# Patient Record
(demographics unavailable — no encounter records)

---

## 2025-01-30 NOTE — HISTORY OF PRESENT ILLNESS
[TextBox_4] : Patient is a 63 yo female here today for annual visit. She denies any GYN complaints at this time. she denies any PMB She is followed by Dr. Jeronimo  She has chronic hypertrophic vulvitis which she uses mycolog cream for with+ relief.  Her bp in office is elevated she has a dx of HTN on losartan and metoporol followed by Sumaya DELANEY

## 2025-01-30 NOTE — PLAN
[FreeTextEntry1] : Patient to follow up in 1 year for annual GYN exam Mammogram and bilateral breast US due: Per FS follows with him yearly.  Colonoscopy due:  12/2032 dr. duarte  Bone density due:    3/2026   reordered nystatin cream to tx hypertrophic vulvitis.  vaginal estradiol sent for vaginal atrophy  Pap ordered Hemoccult ordered  All questions answered, patient agreeable with plan.

## 2025-05-06 NOTE — HEALTH RISK ASSESSMENT
[Former] : Former [0-4] : 0-4 [> 15 Years] : > 15 Years [HIV test declined] : HIV test declined [Hepatitis C test declined] : Hepatitis C test declined [None] : None [With Significant Other] : lives with significant other [Employed] : employed [] :  [# Of Children ___] : has [unfilled] children [Yes] : Yes [2 - 3 times a week (3 pts)] : 2 - 3  times a week (3 points) [1 or 2 (0 pts)] : 1 or 2 (0 points) [Never (0 pts)] : Never (0 points) [No] : In the past 12 months have you used drugs other than those required for medical reasons? No [No falls in past year] : Patient reported no falls in the past year [0] : 2) Feeling down, depressed, or hopeless: Not at all (0) [PHQ-2 Negative - No further assessment needed] : PHQ-2 Negative - No further assessment needed [Sexually Active] : sexually active [Feels Safe at Home] : Feels safe at home [Fully functional (bathing, dressing, toileting, transferring, walking, feeding)] : Fully functional (bathing, dressing, toileting, transferring, walking, feeding) [Fully functional (using the telephone, shopping, preparing meals, housekeeping, doing laundry, using] : Fully functional and needs no help or supervision to perform IADLs (using the telephone, shopping, preparing meals, housekeeping, doing laundry, using transportation, managing medications and managing finances) [With Patient/Caregiver] : , with patient/caregiver [Reviewed no changes] : Reviewed, no changes [I will adhere to the patient's wishes.] : I will adhere to the patient's wishes. [Audit-CScore] : 3 [de-identified] : walking daily on lunch break and has treadmill [de-identified] : well balanced, avoids carbs [[id number]] : 0 [EyeExamDate] : 01/2025 [Patient reported mammogram was normal] : Patient reported mammogram was normal [Patient reported PAP Smear was normal] : Patient reported PAP Smear was normal [Patient reported bone density results were normal] : Patient reported bone density results were normal [Patient reported colonoscopy was normal] : Patient reported colonoscopy was normal [Change in mental status noted] : No change in mental status noted [Language] : denies difficulty with language [High Risk Behavior] : no high risk behavior [MammogramDate] : 07/2024 [PapSmearDate] : 01/2025 [BoneDensityDate] : 2021 [ColonoscopyDate] : 12/2022 [FreeTextEntry2] :  at [facility]

## 2025-05-06 NOTE — HISTORY OF PRESENT ILLNESS
[FreeTextEntry1] : CPE. [de-identified] : Patient is a 61yo female with PMH HTN, IFG, asthma, anemia who presents to the office for CPE.    Last CPE:  05/06/2024 with myself.  GYN Exam:  01/2025, NP Misa Graham; Pap negative for intraepithelial lesion or malignancy.  Mammogram:  07/2024, BIRADS-2 ([facility]; followed by breast surgery Dr. Jeronimo).  DEXA: 2021, reportedly normal.  Due 2026 per GYN.  Colonoscopy: 12/2022, Dr. Brothers; normal, repeat in 10 years.  Ophthalmology:  01/2025, [facility].  Dermatology:  [facility], Dr. Ross.  BCC on top of head, s/p Moh's with Dr. Baldo Jones ([place]).  [place]ist:  [place]. Shingles:  due, recommended today; pt declines.  Flu:  pt declines.  Tdap:  05/2018.  PNA:  offered Capvaxive, pt declines.  COVID:  received.   BP has been high at home.  Has log with her today and has been 150's-170's/90's on average.  Pt asymptomatic.  Takes Metoprolol Succinate 100mg and Losartan 25mg both at night.

## 2025-05-06 NOTE — ASSESSMENT
[Vaccines Reviewed] : Immunizations reviewed today. Please see immunization details in the vaccine log within the immunization flowsheet.  [FreeTextEntry1] : Patient is a 63yo female with PMH HTN, IFG, asthma, anemia who presents to the office for CPE.  Health Maintenance - UTD with routine HCM. - RX for mammogram/breast US provided to be done in 07/2025. - Fasting labs drawn in office. - EKG performed in office NSR, no acute ST/T changes, no arrhythmias.  Pt denies cardiac complaints. - Eat plenty of fruits and vegetables, especially deeply colored fruits/vegetables (such as leafy greens, peaches) that are more nutrient-dense.  Continue to work hard on diet and exercise, limiting/avoiding saturated fat, fatty foods, greasy foods, red meats, white flour-based carbohydrates (cookies, cakes, white bread, white rice), and added sugars.  Chose whole grain foods and products made with whole grains over refined grains and white flour-based carbohydrates.  Avoid beverages and food with added sugar.  Limit salt intake to improve blood pressure.  Limit alcohol intake. - Try and incorporate a minimum of 150 minutes of exercise per week of moderate activity.  You should also try to incorporate ~20 minutes of weight training to your regimen at least 2-3 times per week.  HTN - BP elevated in office x2.  BP elevated on home checks as well.  Pt asymptomatic. - Increase Losartan to 50mg daily, will take in AM. - Continue Metoprolol Succinate 100mg in PM. - Monitor blood pressure at home, keep log, alert office if too high/too low.  If persistently elevated over the next 2 weeks on home checks, will alert office and we will increase Losartan dose. - DASH diet encouraged, regular exercise encouraged. - Alert office if you develop any new, worsening or concerning symptoms including headache, vision changes, dizziness, loss of consciousness, chest pain, shortness of breath, or lower extremity edema.  Call the office or go to the ED immediately if you develop new, worsening or concerning symptoms including high fever, severe headache/worst headache of your life, confusion, dizziness/lightheadedness, loss of consciousness, severe chest pain, difficulty breathing, shortness of breath, severe abdominal pain, excessive vomiting/diarrhea, inability to feel/move the extremities, or any other concerning symptoms.